# Patient Record
Sex: FEMALE | Race: ASIAN | NOT HISPANIC OR LATINO
[De-identification: names, ages, dates, MRNs, and addresses within clinical notes are randomized per-mention and may not be internally consistent; named-entity substitution may affect disease eponyms.]

---

## 2021-10-18 PROBLEM — Z00.00 ENCOUNTER FOR PREVENTIVE HEALTH EXAMINATION: Status: ACTIVE | Noted: 2021-10-18

## 2021-10-21 ENCOUNTER — APPOINTMENT (OUTPATIENT)
Dept: CARDIOLOGY | Facility: CLINIC | Age: 26
End: 2021-10-21
Payer: COMMERCIAL

## 2021-10-21 VITALS
DIASTOLIC BLOOD PRESSURE: 65 MMHG | WEIGHT: 120 LBS | RESPIRATION RATE: 17 BRPM | TEMPERATURE: 98 F | HEIGHT: 59 IN | SYSTOLIC BLOOD PRESSURE: 104 MMHG | BODY MASS INDEX: 24.19 KG/M2 | HEART RATE: 73 BPM | OXYGEN SATURATION: 98 %

## 2021-10-21 DIAGNOSIS — Z78.9 OTHER SPECIFIED HEALTH STATUS: ICD-10-CM

## 2021-10-21 DIAGNOSIS — R94.31 ABNORMAL ELECTROCARDIOGRAM [ECG] [EKG]: ICD-10-CM

## 2021-10-21 PROCEDURE — 93306 TTE W/DOPPLER COMPLETE: CPT

## 2021-10-21 PROCEDURE — 93015 CV STRESS TEST SUPVJ I&R: CPT

## 2021-10-21 PROCEDURE — 99203 OFFICE O/P NEW LOW 30 MIN: CPT | Mod: 25

## 2021-10-28 PROBLEM — Z78.9 SOCIAL ALCOHOL USE: Status: ACTIVE | Noted: 2021-10-28

## 2021-10-28 PROBLEM — Z78.9 NON-SMOKER: Status: ACTIVE | Noted: 2021-10-28

## 2021-10-28 NOTE — REASON FOR VISIT
[FreeTextEntry1] : 26 year-old female with no significant PMH presents for evaluation of abnormal ECG. Patient denies CP, SOB, palpitations, or lightheadedness. Patient had an episode of syncope 5 years ago after using sauna. I advised patient to undergo an echocardiogram and a treadmill stress test.

## 2021-10-30 PROBLEM — R94.31 ABNORMAL ECG: Status: ACTIVE | Noted: 2021-10-22

## 2022-07-19 ENCOUNTER — APPOINTMENT (OUTPATIENT)
Dept: OTOLARYNGOLOGY | Facility: CLINIC | Age: 27
End: 2022-07-19
Payer: COMMERCIAL

## 2022-07-19 VITALS
SYSTOLIC BLOOD PRESSURE: 113 MMHG | TEMPERATURE: 98.8 F | BODY MASS INDEX: 23.56 KG/M2 | HEIGHT: 60 IN | DIASTOLIC BLOOD PRESSURE: 78 MMHG | HEART RATE: 81 BPM | WEIGHT: 120 LBS

## 2022-07-19 DIAGNOSIS — R09.81 NASAL CONGESTION: ICD-10-CM

## 2022-07-19 DIAGNOSIS — J34.2 DEVIATED NASAL SEPTUM: ICD-10-CM

## 2022-07-19 DIAGNOSIS — J34.89 OTHER SPECIFIED DISORDERS OF NOSE AND NASAL SINUSES: ICD-10-CM

## 2022-07-19 PROCEDURE — 99204 OFFICE O/P NEW MOD 45 MIN: CPT | Mod: 25

## 2022-07-19 PROCEDURE — 31231 NASAL ENDOSCOPY DX: CPT

## 2022-07-19 NOTE — PHYSICAL EXAM
[Nasal Endoscopy Performed] : nasal endoscopy was performed, see procedure section for findings [] : septum deviated to the right [Midline] : trachea located in midline position [Normal] : external appearance is normal [de-identified] : hypertrophy + gabriella b/l L>R

## 2022-07-19 NOTE — PROCEDURE
[FreeTextEntry6] : reason for exam: anterior rhinoscopy insufficient for symptom evaluation \par \par Fiberoptic nasal endoscopy was performed.  R/b/a of procedure was explained to the patient and they agreed to proceed with procedure.  Nasal cavities were treated with afrin and lidocaine prior to nasal endoscopy to make the patient more comfortable. B/l inferior turbinate hypertrophy, severe with edematous mucosa.  Remainder of exam normal, including b/l middle turbinates, superior turbinates, inferior, middle and superior meati and sphenoethmoidal recess.  No nasal polyps.  Septum deviated right\par \par scope #: 25\par

## 2022-07-19 NOTE — HISTORY OF PRESENT ILLNESS
[de-identified] : Ms. GUERRERO is a 27 year female with years of nasal congestion R>L, has tried Azelastine and Flonase prn. She does not like the feel of nasal sprays. She states her nose is runny in the am, + tinnitus a few times a year and it lasts seconds\par denies otalgia, otorrhea, vertigo, hearing loss.\par \par

## 2022-07-19 NOTE — END OF VISIT
[FreeTextEntry3] : I personally saw and examined GWENDOLYN GUERRERO in detail.  I spoke to KINGSTON Taylor regarding the assessment and plan of care. I performed the procedures and relevant physical exam.  I have reviewed the above assessment and plan of care and I agree.  I have made changes to the body of the note wherever necessary and appropriate.\par

## 2022-07-19 NOTE — ASSESSMENT
[FreeTextEntry1] : Ms. GUERRERO is a 27 year female with long history of nasal congestion R>L. 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, bilateral inferior turbinate hypertrophy and collapse of nasal valve + gabriella bilateral L>R\par \par - pt can not tolerate nasal sprays / has tried Azelastine and Flonase in the past\par - d/w patient r/b/a of septoplasty / bitr / repair of nasal valve with spreaders \par - d/w patient possibility of intranasal splint for 1 week\par - postoperative instructions were discussed with the patient including no aerobic exercise for 2 weeks, no heavy lifting for 2 weeks\par - risks of bleeding and septal perforation were discussed and all questions answered\par \par after discussion - pt would like to try nasal sprays more regularly.  She will try and then call me if she prefers to pursue surgery \par \par

## 2022-07-19 NOTE — CONSULT LETTER
[Dear  ___] : Dear  [unfilled], [Consult Letter:] : I had the pleasure of evaluating your patient, [unfilled]. [Consult Closing:] : Thank you very much for allowing me to participate in the care of this patient.  If you have any questions, please do not hesitate to contact me. [Sincerely,] : Sincerely, [FreeTextEntry3] : Lanette Ayers MD\par Otolaryngology- Facial Plastics \par [address]\[place] [address]\par (P) - [phone]\par (F) - [phone]\par

## 2023-07-14 ENCOUNTER — TRANSCRIPTION ENCOUNTER (OUTPATIENT)
Age: 28
End: 2023-07-14